# Patient Record
Sex: MALE | Race: OTHER | URBAN - METROPOLITAN AREA
[De-identification: names, ages, dates, MRNs, and addresses within clinical notes are randomized per-mention and may not be internally consistent; named-entity substitution may affect disease eponyms.]

---

## 2021-06-25 ENCOUNTER — OFFICE VISIT (OUTPATIENT)
Dept: SURGERY | Facility: CLINIC | Age: 41
End: 2021-06-25
Payer: COMMERCIAL

## 2021-06-25 VITALS
SYSTOLIC BLOOD PRESSURE: 130 MMHG | HEIGHT: 69 IN | HEART RATE: 64 BPM | TEMPERATURE: 97 F | WEIGHT: 190 LBS | DIASTOLIC BLOOD PRESSURE: 80 MMHG | BODY MASS INDEX: 28.14 KG/M2

## 2021-06-25 DIAGNOSIS — Z12.11 SCREENING FOR MALIGNANT NEOPLASM OF COLON: Primary | ICD-10-CM

## 2021-06-25 DIAGNOSIS — Z80.0 FAMILY HISTORY OF MALIGNANT NEOPLASM OF COLON IN MOTHER: ICD-10-CM

## 2021-06-25 PROCEDURE — 3008F BODY MASS INDEX DOCD: CPT | Performed by: SURGERY

## 2021-06-25 PROCEDURE — S0285 CNSLT BEFORE SCREEN COLONOSC: HCPCS | Performed by: SURGERY

## 2021-06-25 PROCEDURE — 3079F DIAST BP 80-89 MM HG: CPT | Performed by: SURGERY

## 2021-06-25 PROCEDURE — 3075F SYST BP GE 130 - 139MM HG: CPT | Performed by: SURGERY

## 2021-06-25 RX ORDER — POLYETHYLENE GLYCOL 3350, SODIUM CHLORIDE, SODIUM BICARBONATE, POTASSIUM CHLORIDE 420; 11.2; 5.72; 1.48 G/4L; G/4L; G/4L; G/4L
POWDER, FOR SOLUTION ORAL
Qty: 4000 ML | Refills: 0 | Status: SHIPPED | OUTPATIENT
Start: 2021-06-25

## 2021-06-25 NOTE — H&P
New Patient Visit Note       Active Problems      1. Screening for malignant neoplasm of colon    2. Family history of malignant neoplasm of colon in mother        Chief Complaint   Patient presents with:  Colonoscopy: NP colonoscopy consult.  pt's mother h bleeding, blood in stool, constipation, diarrhea, nausea and vomiting. Genitourinary: Negative for difficulty urinating, dysuria, frequency and urgency. Musculoskeletal: Negative for arthralgias and myalgias. Skin: Negative for color change and rash. encounter. Imaging & Referrals   None    Follow Up  No follow-ups on file.     Cory Pearson MD

## 2021-12-17 ENCOUNTER — PATIENT OUTREACH (OUTPATIENT)
Dept: SURGERY | Facility: CLINIC | Age: 41
End: 2021-12-17

## 2023-09-05 ENCOUNTER — APPOINTMENT (OUTPATIENT)
Dept: URBAN - METROPOLITAN AREA CLINIC 247 | Age: 43
Setting detail: DERMATOLOGY
End: 2023-09-05

## 2023-09-05 DIAGNOSIS — Z41.9 ENCOUNTER FOR PROCEDURE FOR PURPOSES OTHER THAN REMEDYING HEALTH STATE, UNSPECIFIED: ICD-10-CM

## 2023-09-05 PROCEDURE — OTHER BOTOX: OTHER

## 2023-09-05 PROCEDURE — OTHER INVENTORY: OTHER

## 2023-09-05 ASSESSMENT — LOCATION ZONE DERM: LOCATION ZONE: FACE

## 2023-09-05 ASSESSMENT — LOCATION DETAILED DESCRIPTION DERM: LOCATION DETAILED: INFERIOR MID FOREHEAD

## 2023-09-05 ASSESSMENT — LOCATION SIMPLE DESCRIPTION DERM: LOCATION SIMPLE: INFERIOR FOREHEAD

## 2024-05-23 ENCOUNTER — APPOINTMENT (OUTPATIENT)
Dept: URBAN - METROPOLITAN AREA CLINIC 248 | Age: 44
Setting detail: DERMATOLOGY
End: 2024-05-23

## 2024-05-23 DIAGNOSIS — Z41.9 ENCOUNTER FOR PROCEDURE FOR PURPOSES OTHER THAN REMEDYING HEALTH STATE, UNSPECIFIED: ICD-10-CM

## 2024-05-23 PROCEDURE — OTHER BOTOX: OTHER

## 2024-05-23 NOTE — PROCEDURE: BOTOX
Detail Level: Detailed
Dilution (U/0.1 Cc): 4
Map Statment: See attached map for complete details
Use Map Statement For Sites (Optional): No
Bill Summary Price Listed Below, Or Bill Total Of Units X Price Per Unit?: Bill Summary Price Below
Platsyma Units: 0
Consent: Written consent was obtained prior to the procedure. Risks, benefits, expectations and alternatives were discussed including, but not limited to, infection, bleeding, lid/brow ptosis, bruising, swelling, diplopia, temporary effects, incomplete chemical denervation and dissatisfaction with the cosmetic outcome. No guarantee or warranty was given or implied regarding longevity of results.
Reconstitution Date: 05/23/2024
Additional Area 1 Location: forehead glabella crows
Postcare Instructions: Patient instructed to not lie down for 4 hours and limit physical activity for 24 hours. Patient instructed not to travel by airplane for 48 hours.
Additional Area 1 Units: 30
Show Inventory Tab: Show

## 2024-10-25 ENCOUNTER — OFFICE VISIT (OUTPATIENT)
Facility: LOCATION | Age: 44
End: 2024-10-25
Payer: COMMERCIAL

## 2024-10-25 VITALS
DIASTOLIC BLOOD PRESSURE: 78 MMHG | TEMPERATURE: 98 F | HEART RATE: 76 BPM | SYSTOLIC BLOOD PRESSURE: 116 MMHG | OXYGEN SATURATION: 96 %

## 2024-10-25 DIAGNOSIS — Z80.0 FAMILY HISTORY OF MALIGNANT NEOPLASM OF COLON IN MOTHER: Primary | ICD-10-CM

## 2024-10-25 PROCEDURE — 99203 OFFICE O/P NEW LOW 30 MIN: CPT | Performed by: SURGERY

## 2024-10-25 RX ORDER — ATORVASTATIN CALCIUM 20 MG/1
20 TABLET, FILM COATED ORAL DAILY
COMMUNITY
Start: 2024-10-15

## 2024-10-25 RX ORDER — POLYETHYLENE GLYCOL 3350, SODIUM CHLORIDE, SODIUM BICARBONATE, POTASSIUM CHLORIDE 420; 11.2; 5.72; 1.48 G/4L; G/4L; G/4L; G/4L
POWDER, FOR SOLUTION ORAL
Qty: 1 EACH | Refills: 0 | Status: SHIPPED | OUTPATIENT
Start: 2024-10-25

## 2024-10-25 RX ORDER — SOD SULF/POT CHLORIDE/MAG SULF 1.479 G
TABLET ORAL
Qty: 12 TABLET | Refills: 0 | Status: SHIPPED | OUTPATIENT
Start: 2024-10-25

## 2024-10-25 NOTE — H&P
New Patient Visit Note       Active Problems      1. Family history of malignant neoplasm of colon in mother        Chief Complaint   Chief Complaint   Patient presents with    New Patient     NP - Colonoscopy, mother has colon cancer, no symptoms.       History of Present Illness   Pt here for evaluation for a repeat colonoscopy.  Pt's mother passed from an obstructing colon cancer at age 68.  Pt's last colonoscopy was in 10/2021 - internal hemorrhoids.  Pt denies any blood per rectum or change in bowel habits.  Usually one soft BM daily.  Pt works in sales.      Allergies  Georges has No Known Allergies.    Past Medical / Surgical / Social / Family History    The past medical and past surgical history have been reviewed by me today.    History reviewed. No pertinent past medical history.  History reviewed. No pertinent surgical history.    The family history and social history have been reviewed by me today.    Family History   Problem Relation Age of Onset    Colon Cancer Mother      Social History     Socioeconomic History    Marital status:    Tobacco Use    Smoking status: Never    Smokeless tobacco: Never        Current Outpatient Medications:     atorvastatin 20 MG Oral Tab, Take 1 tablet (20 mg total) by mouth daily., Disp: , Rfl:     PEG 3350-KCl-Na Bicarb-NaCl (TRILYTE) 420 g Oral Recon Soln, Starting at 4:00 pm the night before procedure, drink 8 ounces of the prep every 15-20 minutes until finished, Disp: 1 each, Rfl: 0    Sodium Sulfate-Mag Sulfate-KCl (SUTAB) 7525-565-843 MG Oral Tab, Starting at 4PM the night before your procedure open one bottle and take all 12 tablets with 16 ounces of water within 15-20 minutes. At 9PM open the second bottle and take all 12 tablets with another 16 ounces of water., Disp: 12 tablet, Rfl: 0      Review of Systems  The Review of Systems has been reviewed by me during today.  Review of Systems   Constitutional: Negative.    HENT: Negative.     Eyes: Negative.     Respiratory: Negative.     Cardiovascular: Negative.    Gastrointestinal: Negative.    Genitourinary: Negative.    Musculoskeletal: Negative.    Skin: Negative.    Neurological: Negative.    Psychiatric/Behavioral: Negative.         Physical Findings   /78 (BP Location: Left arm, Patient Position: Sitting, Cuff Size: adult)   Pulse 76   Temp 97.8 °F (36.6 °C) (Oral)   SpO2 96%   Physical Exam  Vitals and nursing note reviewed.   Cardiovascular:      Rate and Rhythm: Normal rate and regular rhythm.   Pulmonary:      Effort: Pulmonary effort is normal. No respiratory distress.      Breath sounds: Normal breath sounds.   Abdominal:      General: Abdomen is flat. Bowel sounds are normal. There is no distension.      Palpations: Abdomen is soft.      Tenderness: There is no abdominal tenderness.   Musculoskeletal:      Cervical back: Normal range of motion and neck supple.             Assessment   1. Family history of malignant neoplasm of colon in mother      Pt in need of a colonoscopy given his mother's history of colon cancer.    Plan   Colonoscopy scheduled at the [facility] on 2/21/25.  Procedure discussed with risks, benefits, alternatives.  Risks include but are not exclusive to infection, bleeding, perforation, and missed lesion.  Bowel prep discussed with the patient and printed instructions provided.  All questions answered.       No orders of the defined types were placed in this encounter.      Imaging & Referrals   None    Follow Up  No follow-ups on file.    Seamus Ortiz MD

## 2025-01-15 ENCOUNTER — TELEPHONE (OUTPATIENT)
Facility: LOCATION | Age: 45
End: 2025-01-15

## 2025-01-15 DIAGNOSIS — Z80.0 FAMILY HISTORY OF COLON CANCER: Primary | ICD-10-CM

## 2025-02-04 ENCOUNTER — TELEPHONE (OUTPATIENT)
Facility: LOCATION | Age: 45
End: 2025-02-04

## 2025-02-04 NOTE — TELEPHONE ENCOUNTER
No prior authorization required for cpt code 95344. Ref #: [id number]. Spoke with automated services.

## 2025-02-07 RX ORDER — SODIUM CHLORIDE, SODIUM LACTATE, POTASSIUM CHLORIDE, CALCIUM CHLORIDE 600; 310; 30; 20 MG/100ML; MG/100ML; MG/100ML; MG/100ML
INJECTION, SOLUTION INTRAVENOUS CONTINUOUS
Status: CANCELLED | OUTPATIENT
Start: 2025-02-07

## 2025-02-20 ENCOUNTER — ANESTHESIA (OUTPATIENT)
Dept: ENDOSCOPY | Facility: HOSPITAL | Age: 45
End: 2025-02-20
Payer: COMMERCIAL

## 2025-02-20 ENCOUNTER — HOSPITAL ENCOUNTER (OUTPATIENT)
Facility: HOSPITAL | Age: 45
Setting detail: HOSPITAL OUTPATIENT SURGERY
Discharge: HOME | End: 2025-02-20
Attending: SURGERY | Admitting: SURGERY
Payer: COMMERCIAL

## 2025-02-20 ENCOUNTER — ANESTHESIA EVENT (OUTPATIENT)
Dept: ENDOSCOPY | Facility: HOSPITAL | Age: 45
End: 2025-02-20
Payer: COMMERCIAL

## 2025-02-20 VITALS
DIASTOLIC BLOOD PRESSURE: 83 MMHG | HEIGHT: 69 IN | SYSTOLIC BLOOD PRESSURE: 129 MMHG | BODY MASS INDEX: 28.14 KG/M2 | OXYGEN SATURATION: 100 % | HEART RATE: 61 BPM | WEIGHT: 190 LBS | TEMPERATURE: 97 F | RESPIRATION RATE: 16 BRPM

## 2025-02-20 DIAGNOSIS — Z80.0 FAMILY HISTORY OF COLON CANCER: ICD-10-CM

## 2025-02-20 PROBLEM — Z12.12 ENCOUNTER FOR SCREENING FOR COLORECTAL CANCER IN HIGH RISK PATIENT: Status: ACTIVE | Noted: 2021-06-25

## 2025-02-20 PROBLEM — Z91.89 ENCOUNTER FOR SCREENING FOR COLORECTAL CANCER IN HIGH RISK PATIENT: Status: ACTIVE | Noted: 2021-06-25

## 2025-02-20 PROBLEM — Z12.11 ENCOUNTER FOR SCREENING FOR COLORECTAL CANCER IN HIGH RISK PATIENT: Status: ACTIVE | Noted: 2021-06-25

## 2025-02-20 PROCEDURE — 0DBL8ZX EXCISION OF TRANSVERSE COLON, VIA NATURAL OR ARTIFICIAL OPENING ENDOSCOPIC, DIAGNOSTIC: ICD-10-PCS | Performed by: SURGERY

## 2025-02-20 PROCEDURE — 45385 COLONOSCOPY W/LESION REMOVAL: CPT | Performed by: SURGERY

## 2025-02-20 DEVICE — REPLAY HEMOSTASIS CLIP, 11MM SPAN
Type: IMPLANTABLE DEVICE
Brand: REPLAY

## 2025-02-20 RX ORDER — SODIUM CHLORIDE, SODIUM LACTATE, POTASSIUM CHLORIDE, CALCIUM CHLORIDE 600; 310; 30; 20 MG/100ML; MG/100ML; MG/100ML; MG/100ML
INJECTION, SOLUTION INTRAVENOUS CONTINUOUS
Status: DISCONTINUED | OUTPATIENT
Start: 2025-02-20 | End: 2025-02-20

## 2025-02-20 RX ORDER — LIDOCAINE HYDROCHLORIDE 10 MG/ML
INJECTION, SOLUTION EPIDURAL; INFILTRATION; INTRACAUDAL; PERINEURAL AS NEEDED
Status: DISCONTINUED | OUTPATIENT
Start: 2025-02-20 | End: 2025-02-20 | Stop reason: SURG

## 2025-02-20 RX ORDER — NALOXONE HYDROCHLORIDE 0.4 MG/ML
0.08 INJECTION, SOLUTION INTRAMUSCULAR; INTRAVENOUS; SUBCUTANEOUS ONCE AS NEEDED
Status: DISCONTINUED | OUTPATIENT
Start: 2025-02-20 | End: 2025-02-20

## 2025-02-20 RX ADMIN — LIDOCAINE HYDROCHLORIDE 100 MG: 10 INJECTION, SOLUTION EPIDURAL; INFILTRATION; INTRACAUDAL; PERINEURAL at 09:16:00

## 2025-02-20 NOTE — H&P
History and Physical     Georges Jeffries Patient Status:  [facility] Outpatient Surgery    1980 MRN [id number]   Location [facility] ENDOSCOPY [facility]IN [facility] Attending Rene Waller MD   Hosp Day # 0 PCP LEVON Forrest     Chief Complaint: None    Subjective:    Georges Jeffries is a 45 year old male with 1st degree relative  from CRC at age 66. Prior colonoscopy in  with internal hemorrhoids. No change in bowel habits.     History/Other:      Past Medical History:  Past Medical History:    High cholesterol    Visual impairment    Contacts, reading glasses        Past Surgical History:   Past Surgical History:   Procedure Laterality Date    Colonoscopy         Social History:  reports that he has never smoked. He has never used smokeless tobacco. He reports current alcohol use. He reports that he does not currently use drugs.    Family History:   Family History   Problem Relation Age of Onset    Colon Cancer Mother        Allergies: Allergies[1]    Medications:  Medications Ordered Prior to Encounter[2]    Review of Systems:   A comprehensive 14 point review of systems was completed.    Pertinent positives and negatives noted in the HPI.    Objective:   General: Alert, orientated x3.  Cooperative.  No apparent distress.  Vital Signs:  Height 69\", weight 190 lb (86.2 kg). Body mass index is 28.06 kg/m².  HEENT: NC/AT   Lungs: Even, unlabored  Cardiac: Regular rate and rhythm. No murmur.  Abdomen: Soft, ND/NT, no R/G   Extremities:  SMAE  Skin: Warm & dry        Assessment & Plan:    #Plan for colonoscopy today.      Rene Waller MD  2025    Supplementary Documentation:                                      [1] No Known Allergies  [2]   No current facility-administered medications on file prior to encounter.     Current Outpatient Medications on File Prior to Encounter   Medication Sig Dispense Refill    atorvastatin 20 MG Oral Tab Take 1 tablet (20 mg total) by mouth daily.      PEG 3350-KCl-Na  Bicarb-NaCl (TRILYTE) 420 g Oral Recon Soln Starting at 4:00 pm the night before procedure, drink 8 ounces of the prep every 15-20 minutes until finished 1 each 0    Sodium Sulfate-Mag Sulfate-KCl (SUTAB) 4632-981-312 MG Oral Tab Starting at 4PM the night before your procedure open one bottle and take all 12 tablets with 16 ounces of water within 15-20 minutes. At 9PM open the second bottle and take all 12 tablets with another 16 ounces of water. 12 tablet 0

## 2025-02-20 NOTE — ANESTHESIA PREPROCEDURE EVALUATION
PRE-OP EVALUATION    Patient Name: Georges Jeffries    Admit Diagnosis: Family history of colon cancer [Z80.0]    Pre-op Diagnosis: Family history of colon cancer [Z80.0]    COLONOSCOPY    Anesthesia Procedure: COLONOSCOPY    Surgeons and Role:     * Reen Waller MD - Primary    Pre-op vitals reviewed.  Temp: 97.4 °F (36.3 °C)  Pulse: 69  Resp: 18  BP: 123/82  SpO2: 99 %  Body mass index is 28.06 kg/m².    Current medications reviewed.  Hospital Medications:   lactated ringers infusion   Intravenous Continuous       Outpatient Medications:   Prescriptions Prior to Admission[1]    Allergies: Patient has no known allergies.      Anesthesia Evaluation    Patient summary reviewed.    Anesthetic Complications           GI/Hepatic/Renal                                 Cardiovascular                     (+) hyperlipidemia                                  Endo/Other                                  Pulmonary                           Neuro/Psych                                      Past Surgical History:   Procedure Laterality Date    Colonoscopy       Social History     Socioeconomic History    Marital status:    Tobacco Use    Smoking status: Never    Smokeless tobacco: Never   Vaping Use    Vaping status: Never Used   Substance and Sexual Activity    Alcohol use: Yes     Comment: Socially    Drug use: Not Currently     History   Drug Use Unknown     Available pre-op labs reviewed.               Airway      Mallampati: II  Mouth opening: >3 FB  TM distance: > 6 cm  Neck ROM: full Cardiovascular    Cardiovascular exam normal.         Dental             Pulmonary    Pulmonary exam normal.                 Other findings              ASA: 2   Plan: MAC  NPO status verified and           Plan/risks discussed with: patient                Present on Admission:  **None**             [1]   Medications Prior to Admission   Medication Sig Dispense Refill Last Dose/Taking    atorvastatin 20 MG Oral Tab Take 1 tablet (20 mg total)  by mouth daily.   Past Month    PEG 3350-KCl-Na Bicarb-NaCl (TRILYTE) 420 g Oral Recon Soln Starting at 4:00 pm the night before procedure, drink 8 ounces of the prep every 15-20 minutes until finished 1 each 0 Taking    Sodium Sulfate-Mag Sulfate-KCl (SUTAB) 7489-973-228 MG Oral Tab Starting at 4PM the night before your procedure open one bottle and take all 12 tablets with 16 ounces of water within 15-20 minutes. At 9PM open the second bottle and take all 12 tablets with another 16 ounces of water. 12 tablet 0 Taking

## 2025-02-20 NOTE — OPERATIVE REPORT
[facility]                                                                                              Colonoscopy Operative Report    Georges Jeffries Patient Status:  Hospital Outpatient Surgery    1980 MRN [id number]   Location [facility] ENDOSCOPY PAIN CENTER Attending Rene Waller MD   Hosp Day #   0 PCP LEVON Forrest     Pre-Operative Diagnosis: Family history of colon cancer [Z80.0]    Post-Operative Diagnosis: polyp    Procedure Performed: Procedure(s):  COLONOSCOPY with hot snare plypectomy and clip placement    Informed Consent: Informed consent for both the procedure and sedation were obtained from the patient. The potentially life-threatening complications of sedation, bleeding,  perforation, transfusion or repeat endoscopy  were reviewed along with the possible need for hospitalization, surgical management, transfusion or repeat endoscopy should one of these complications arise. The patient understands and is agreeable to proceed.  Sedation Type: MAC  Date of previous colonoscopy:  10/1/2021    Procedure Description: The patient was placed in the left lateral decubitus position.  After careful digital rectal examination which was unremarkable, the Adult colonoscope was inserted into the rectum and advanced to the level of the cecum under direct visualization. The cecum was identified by landmarks, including the appendiceal orifice and ileoceccal valve. Careful examination of the entire colon was performed during withdrawal of the endoscope. There was some pooling of fluid that was able to be cleared. Within one puddle in the transverse colon, there was a 1cm pedunculated  polyp. This was removed w/ hot snare. A clip was placed for localization if staging is necessary. The scope was withdrawn to the rectum and retroflexion was performed, there  were noninflammed non bleeding hemorrhoidal plexus seen.  The patient tolerated  the procedure well with no immediate complications. The patient was transferred to the recovery area in stable condition.  Quality of Preparation: Adequate  [place] Bowel Prep Score:        Right Colon: 3        Transverse Colon: 3        Left Colon: 3  Total Score:  9    Findings:  Large transverse colon pedunculated polyp (1cm), noninflammed internal hemmorrhoid plexus     Specimens:  ID Type Source Tests Collected by Time Destination   1 : polyp Tissue Colon transverse SURGICAL PATHOLOGY TISSUE Rene Waller MD 2/20/2025  9:35 AM        Recommendations: Repeat colonoscopy in 3 years  Follow-up: In 3 years, medical reason: Large polyp & family history  Rene Waller MD  2/20/2025  9:44 AM

## 2025-02-20 NOTE — ANESTHESIA POSTPROCEDURE EVALUATION
[facility] Patient Status:  Hospital Outpatient Surgery   Age/Gender 45 year old male MRN [id number]   Location [facility] ENDOSCOPY PAIN CENTER Attending Rene Waller MD   Hosp Day # 0 PCP LEVON Forrest       Anesthesia Post-op Note    COLONOSCOPY with hot snare plypectomy and clip placement    Procedure Summary       Date: 02/20/25 Room / Location:  ENDOSCOPY 04 /  ENDOSCOPY    Anesthesia Start: 0912 Anesthesia Stop: 0948    Procedure: COLONOSCOPY with hot snare plypectomy and clip placement Diagnosis:       Family history of colon cancer      (polyp)    Surgeons: Rene Waller MD Anesthesiologist: Sherman Lance MD    Anesthesia Type: MAC ASA Status: 2            Anesthesia Type: MAC    Vitals Value Taken Time   /69 02/20/25 0946   Temp  02/20/25 0950   Pulse 62 02/20/25 0949   Resp  02/20/25 0950   SpO2 100 % 02/20/25 0949   Vitals shown include unfiled device data.        Patient Location: Endoscopy    Anesthesia Type: MAC    Airway Patency: patent    Postop Pain Control: adequate    Mental Status: preanesthetic baseline    Nausea/Vomiting: none    Cardiopulmonary/Hydration status: stable euvolemic    Complications: no apparent anesthesia related complications    Postop vital signs: stable    Dental Exam: Unchanged from Preop

## 2025-02-20 NOTE — DISCHARGE INSTRUCTIONS
Home Care Instructions for Colonoscopy  With Sedation    Diet:  - Resume your regular diet as tolerated unless otherwise instructed.  - start with light meals to minimize bloating.  - Do not drink alcohol today.    Medication:  - If you have questions about resuming your normal medications, please contact your Primary Care Physician.    Activities:  - Take it easy today. Do not return to work today.  - Do not drive today.  - Do not operate any machinery today (including kitchen equipment).    Colonoscopy:  - You may notice some rectal \"spotting\" (a little blood on the toilet tissue) for a day or two after the exam. This is normal.  - If you experience any rectal bleeding (not spotting), persistent tenderness or sharp severe abdominal pains, oral temperature over 100 degrees Farenheit, light-headedness or dizziness, or any other problems, contact your doctor.    **If unable to reach your doctor, please go to the [facility] Emergency Room**    - Your referring physician will receive a full report of your examination.  - If you do not hear from your doctor's office within two weeks of your biopsy, please call them for your results.    Additional Comments/Instructions (if applicable):

## 2025-02-25 ENCOUNTER — TELEPHONE (OUTPATIENT)
Facility: LOCATION | Age: 45
End: 2025-02-25

## 2025-02-25 NOTE — TELEPHONE ENCOUNTER
----- Message from Rene Waller sent at 2/24/2025  1:27 PM CST -----  Repeat colonoscopy 3-5 years  ----------------    Recall placed, and Care Gaps updated.

## (undated) DEVICE — LASSO POLYPECTOMY SNARE: Brand: LASSO

## (undated) DEVICE — KIT VLV 5 PC AIR H2O SUCT BX ENDOGATOR CONN

## (undated) DEVICE — 1200CC GUARDIAN II: Brand: GUARDIAN

## (undated) DEVICE — 3M™ RED DOT™ MONITORING ELECTRODE WITH FOAM TAPE AND STICKY GEL 2570-3, 3/BAG, 200/CASE, 54/PLT: Brand: RED DOT™

## (undated) DEVICE — V2 SPECIMEN COLLECTION MANIFOLD KIT: Brand: NEPTUNE

## (undated) DEVICE — KIT CUSTOM ENDOPROCEDURE STERIS

## (undated) DEVICE — CO2 CANN,MICROFILTER,ADULT,14',NASAL: Brand: MEDLINE